# Patient Record
Sex: MALE | Race: BLACK OR AFRICAN AMERICAN | Employment: FULL TIME | URBAN - METROPOLITAN AREA
[De-identification: names, ages, dates, MRNs, and addresses within clinical notes are randomized per-mention and may not be internally consistent; named-entity substitution may affect disease eponyms.]

---

## 2018-07-16 ENCOUNTER — HOSPITAL ENCOUNTER (OUTPATIENT)
Dept: PHYSICAL THERAPY | Age: 53
Discharge: HOME | End: 2018-07-16
Payer: COMMERCIAL

## 2018-07-16 PROCEDURE — 97161 PT EVAL LOW COMPLEX 20 MIN: CPT

## 2018-07-16 PROCEDURE — 97110 THERAPEUTIC EXERCISES: CPT

## 2018-07-16 NOTE — PROGRESS NOTES
PHYSICAL THERAPY - DAILY TREATMENT NOTE    Patient Name: Ceferino Cesar. Date: 2018  : 1965   yes Patient  Verified  Visit #:     Insurance: Payor: Casi Martinez / Plan: [address] / Product Type: [employer] /      In time: 10:00A Out time: 10:45A   Total Treatment Time: 39     Medicare/ BCBS Time Tracking (below)   Total Timed Codes (min):  NA 1:1 Treatment Time:  NA     TREATMENT AREA =  Left foot pain [M79.272]    SUBJECTIVE  Pain Level (on 0 to 10 scale):  0  / 10   Medication Changes/New allergies or changes in medical history, any new surgeries or procedures?    no  If yes, update Summary List   Subjective Functional Status/Changes:  []  No changes reported     See POC          OBJECTIVE  Modalities Rationale:    PD   min [] Estim, type/location:                                      []  att     []  unatt     []  w/US     []  w/ice    []  w/heat    min []  Mechanical Traction: type/lbs                   []  pro   []  sup   []  int   []  cont    []  before manual    []  after manual    min []  Ultrasound, settings/location:      min []  Iontophoresis w/ dexamethasone, location:                                               []  take home patch       []  in clinic    min []  Ice     []  Heat    location/position:     min []  Vasopneumatic Device, press/temp:     min []  Other:    [] Skin assessment post-treatment (if applicable):    []  intact    []  redness- no adverse reaction     []redness  adverse reaction:        10 min Therapeutic Exercise:  [x]  See flow sheet   Rationale:      increase ROM, increase strength, improve coordination, improve balance and increase proprioception to improve the patients ability to perform unlimited ADLs      min Patient Education:  yes  Reviewed HEP   []  Progressed/Changed HEP based on:        Other Objective/Functional Measures:    See POC     Post Treatment Pain Level (on 0 to 10) scale:   0  / 10 ASSESSMENT  Assessment/Changes in Function:     See POC     []  See Progress Note/Recertification   Patient will continue to benefit from skilled PT services to modify and progress therapeutic interventions, address functional mobility deficits, address ROM deficits, address strength deficits, analyze and address soft tissue restrictions, analyze and cue movement patterns, analyze and modify body mechanics/ergonomics, assess and modify postural abnormalities, address imbalance/dizziness and instruct in home and community integration to attain remaining goals.    Progress toward goals / Updated goals:    See POC     PLAN  [x]  Upgrade activities as tolerated yes Continue plan of care   []  Discharge due to :    []  Other:      Therapist: Nellie Ramirez PT, DPT    Date: 7/16/2018 Time: 1:56 PM     Future Appointments  Date Time Provider Valentín Anderson   7/23/2018 3:00 PM Radha [facility]   7/25/2018 11:30 AM [facility]   7/30/2018 9:30 AM [facility]   8/1/2018 2:30 PM Radha [facility]   [facility]2018 [facility]   8/8/2018 2:30 PM Radha [facility]   8/13/2018 2:00 PM Radha [facility]   8/15/2018 1:00 PM [facility]

## 2018-07-16 NOTE — PROGRESS NOTES
Ebony English [address]  [facility] [place] [facility]  [facility] [address], [address] ,[address], [address]  Phone: [phone]  Fax: [phone] / [address] [address] PHYSICAL THERAPY SERVICES  Patient Name: Christopher White. : 1965   Medical   Diagnosis: Left foot pain [M79.672] Treatment Diagnosis: L foot pain   Onset Date: 2-3 mo prior to evaluation     Referral Source: Teresa Teixeira MD [facility]): 2018   Prior Hospitalization: See medical history Provider #: [id number]   Prior Level of Function: Pain free ambulation   Comorbidities: Chronic LBP with L radicular sx   Medications: Verified on Patient Summary List   The Plan of Care and following information is based on the information from the initial evaluation.   ==================================================================================  Assessment / key information:  Pt is a 47 y.o. male who presents to [facility] with Dx of L foot plantar fascitis. Patient reports initial onset of sx approx 2-3 months prior to evaluation with an GIANFRANCO: insidious Patient reports sx are intermittent in nature. Walking first thing in the AM increase sx, stretching and rest decrease symptoms. Average reported pain level at 2-3/10, 5-6/10 at worst & 0/10 at best.      Objective evaluation findings are as follows: 1) TTP along medial calcaneus on the L 2) L ankle strength is grossly:  L posterior tib: 4/5, eversion 4+/5, inversion 4+/5, plantar flexion 4+/5, and dorsiflexion 4+/5. R ankle strength is 5/5 throughout. 3) poor single limb stability noted bilaterally, 15s on firm ground 4) DF AROM is as follows: with knee extended: to neutral bilaterally, with knee bent: R 10 degrees, L 4 degrees. 5) myotomal and dermatomal LE findings are WNL. Patient signs and symptoms are consistent with L plantar fascitis.           Patient can benefit from PT interventions to improve strength, range of motion, balance, proprioception, coordination, decrease pain, to facilitate ADLs & overall functional status.   ==================================================================================  Eval Complexity: History MEDIUM  Complexity : 1-2 comorbidities / personal factors will impact the outcome/ POC ;  Examination  MEDIUM Complexity : 3 Standardized tests and measures addressing body structure, function, activity limitation and / or participation in recreation ; Presentation LOW Complexity : Stable, uncomplicated ;  Decision Making MEDIUM Complexity : FOTO score of 26-74; Overall Complexity LOW   Problem List: pain affecting function, decrease ROM, decrease strength, edema affecting function, impaired gait/ balance, decrease ADL/ functional abilitiies, decrease activity tolerance, decrease flexibility/ joint mobility and decrease transfer abilities   Treatment Plan may include any combination of the following: Therapeutic exercise, Therapeutic activities, Neuromuscular re-education, Physical agent/modality, Gait/balance training, Manual therapy, Aquatic therapy, Patient education, Self Care training, Functional mobility training, Home safety training and Stair training  Patient / Family readiness to learn indicated by: asking questions, trying to perform skills and interest  Persons(s) to be included in education: patient (P)  Barriers to Learning/Limitations: None  Measures taken:    Patient Goal (s): \"\"Relief\"   Patient self reported health status: good  Rehabilitation Potential: good   Short Term Goals: To be accomplished in  2  weeks:  1) Establish HEP to prevent further disability. 2) Patient will report decreased c/o pain to < or = 2/10 to facilitate ADLs with manageable sx in L foot. 3) Patient will maintain SLS on compliant surface with good stability for >/= 20s in order to allow for ease with ambulation without shoes donned.  Long Term Goals:  To be accomplished in  4-6 weeks:  1) Pt to be I and compliant with progressive, high level stretching and flexibility program in order to maintain gains made in physical therapy. 2) Pt will report 75% improvement in overall condition in order to allow for ease with ADLs. 3) Patient will improve DF AROM on the L to 0-5 in order to allow for ease with unlimited ambulation. 4) Patient will increase FOTO score to 76 in order to perform unlimited ADLs. Frequency / Duration:   Patient to be seen  2  times per week for 4-6  weeks:  Patient / Caregiver education and instruction: self care, activity modification and exercises  G-Codes (GP): TJ  Therapist Signature: Arjun Aguilar PT, DPT Date: 8/72/2372   Certification Period: NA Time: 1:35 PM   ===========================================================================================  I certify that the above Physical Therapy Services are being furnished while the patient is under my care. I agree with the treatment plan and certify that this therapy is necessary. Physician Signature:        Date:       Time:     Please sign and return to [facility] [place] or you may fax the signed copy to [phone]. Thank you.

## 2018-07-23 ENCOUNTER — HOSPITAL ENCOUNTER (OUTPATIENT)
Dept: PHYSICAL THERAPY | Age: 53
Discharge: HOME | End: 2018-07-23
Payer: COMMERCIAL

## 2018-07-23 PROCEDURE — 97110 THERAPEUTIC EXERCISES: CPT

## 2018-07-23 PROCEDURE — 97140 MANUAL THERAPY 1/> REGIONS: CPT

## 2018-07-23 NOTE — PROGRESS NOTES
PHYSICAL THERAPY - DAILY TREATMENT NOTE    Patient Name: Arjun Peñaloza. Date: 2018  : 1965   yes Patient  Verified  Visit #:   2   of   12  Insurance: Payor: Del Single / Plan: [place] [employer] P[place] / Product Type: PPO /      In time: 3:00 Out time: 3:52   Total Treatment Time: 52     Medicare/BCBS Time Tracking (below)   Total Timed Codes (min):  NA 1:1 Treatment Time:  NA     TREATMENT AREA =  Left foot pain [M79.672]    SUBJECTIVE  Pain Level (on 0 to 10 scale):  5  / 10   Medication Changes/New allergies or changes in medical history, any new surgeries or procedures?    no  If yes, update Summary List   Subjective Functional Status/Changes:  []  No changes reported     Patient reports always having L heel pain first thing in the morning and increases throughout the day wearing his steel-toe boots. States that he has orthotics in his steel-toe boots, however feels no relief from them. OBJECTIVE  Modalities Rationale:     decrease edema, decrease inflammation and decrease pain to improve patient's ability to return to pain-free walking/standing activities.    min [] Estim, type/location:                                      []  att     []  unatt     []  w/US     []  w/ice    []  w/heat    min []  Mechanical Traction: type/lbs                   []  pro   []  sup   []  int   []  cont    []  before manual    []  after manual    min []  Ultrasound, settings/location:      min []  Iontophoresis w/ dexamethasone, location:                                               []  take home patch       []  in clinic    min []  Ice     []  Heat    location/position:     min []  Vasopneumatic Device, press/temp:     min []  Other:    [] Skin assessment post-treatment (if applicable):    []  intact    []  redness- no adverse reaction     []redness  adverse reaction:        42 min Therapeutic Exercise:  [x]  See flow sheet   Rationale:      increase ROM, increase strength, improve coordination, improve balance and increase proprioception to improve the patients ability to perform pain-free functional ADLs. 10 min Manual Therapy: IASTM to L plantar fascia and calcaneus; STM/DTM to gastroc/soleus, post tib   Rationale:     Increase ROM to improve patient's ability to perform pain-free standing activities     min Therapeutic Activity:    Rationale:       min Neuromuscular Re-ed:    Rationale:         min Gait Training:    Rationale:         min Patient Education:  yes  Reviewed HEP   []  Progressed/Changed HEP based on: Other Objective/Functional Measures:    Presented to PT session with flip flops on. Initiated therapeutic exercises during PT session     Post Treatment Pain Level (on 0 to 10) scale:   4  / 10     ASSESSMENT  Assessment/Changes in Function:     Demonstrated good tolerance with initiation of exercises; denies sharp pain during PT session. Req'd cues to avoid excessive knee rotation during TB exercises. Noted minimal decrease in L heel pain following PT session. Discussed and educated patient on wearing proper shoes to PT and utilizing frozen water bottle at home; patient responded with good understanding. []  See Progress Note/Recertification   Patient will continue to benefit from skilled PT services to modify and progress therapeutic interventions, address functional mobility deficits, address ROM deficits, address strength deficits, analyze and address soft tissue restrictions, analyze and cue movement patterns and instruct in home and community integration to attain remaining goals.    Progress toward goals / Updated goals:    Good progress towards newly established goals     PLAN  []  Upgrade activities as tolerated yes Continue plan of care   []  Discharge due to :    []  Other:      Therapist: RENO Baires    Date: 7/23/2018 Time: 4:12 PM     Future Appointments  Date Time Provider Valentín Anderson   7/23/2018 3:00 PM Catrachito Rahman [facility]   7/25/2018 11:30 AM [facility]   7/30/2018 9:30 AM [facility]   8/1/2018 2:30 PM Mae Sloan [facility]   8/6/2018 2:00 PM Mae Nathalia [facility]   8/8/2018 2:30 PM Mae Sloan [facility]   8/13/2018 2:00 PM Mae Sloan [facility]   8/15/2018 1:00 PM [facility]

## 2018-07-30 ENCOUNTER — HOSPITAL ENCOUNTER (OUTPATIENT)
Dept: PHYSICAL THERAPY | Age: 53
Discharge: HOME | End: 2018-07-30
Payer: COMMERCIAL

## 2018-07-30 PROCEDURE — 97110 THERAPEUTIC EXERCISES: CPT

## 2018-07-30 NOTE — PROGRESS NOTES
PHYSICAL THERAPY - DAILY TREATMENT NOTE Patient Name: Esther Lisa. Date: 2018 : 1965   yes Patient  Verified Visit #:   3   of   12  Insurance: Payor: Heraclio Clark / Plan: [employer] P[employer] / Product Type: PPO / In time: 9:30A Out time: 10:30A Total Treatment Time: 60 Medicare/ [facility] Time Tracking (below) Total Timed Codes (min):  NA 1:1 Treatment Time:  NA  
 
TREATMENT AREA =  Left foot pain [M79.672] SUBJECTIVE Pain Level (on 0 to 10 scale):  4  / 10 Medication Changes/New allergies or changes in medical history, any new surgeries or procedures?    no  If yes, update Summary List  
Subjective Functional Status/Changes:  []  No changes reported \"I can really feel the bump on my heel. \" OBJECTIVE Modalities Rationale:     decrease inflammation and decrease pain to improve patient's ability to perform unlimited ambulation 
 min [] Estim, type/location:    
                                 []  att     []  unatt     []  w/US     []  w/ice    []  w/heat  
 min []  Mechanical Traction: type/lbs   
               []  pro   []  sup   []  int   []  cont    []  before manual    []  after manual  
 min []  Ultrasound, settings/location:    
 min []  Iontophoresis w/ dexamethasone, location:   
                                           []  take home patch       []  in clinic  
10 min [x]  Ice     []  Heat    location/position: Supine with wedge to L ankle and foot. min []  Vasopneumatic Device, press/temp:   
 min []  Other:   
[] Skin assessment post-treatment (if applicable):   
[]  intact    []  redness- no adverse reaction    
[]redness  adverse reaction:     
 
45 min Therapeutic Exercise:  [x]  See flow sheet Rationale:      increase ROM, increase strength, improve coordination, improve balance and increase proprioception to improve the patients ability to perform unlimited ADLs 5NB min Manual Therapy: STM/ DTM to L plantar fascia, manual grastroc stretch Rationale:      decrease pain, increase ROM and increase tissue extensibility to improve patient's ability to perform unlimited ADLs 
 
 min Patient Education:  yes  Reviewed HEP []  Progressed/Changed HEP based on: Other Objective/Functional Measures: 
 
Resumed standing gastroc/soleus stretch on ground. Post Treatment Pain Level (on 0 to 10) scale:   3  / 10 ASSESSMENT Assessment/Changes in Function:  
 
Patient continues to have palpable hard lump on on calcaneus that is tender to touch. Plan to continue with flexibility of gastroc/ soleus. []  See Progress Note/Recertification Patient will continue to benefit from skilled PT services to modify and progress therapeutic interventions, address functional mobility deficits, address ROM deficits, address strength deficits, analyze and address soft tissue restrictions, analyze and cue movement patterns, analyze and modify body mechanics/ergonomics, assess and modify postural abnormalities, address imbalance/dizziness and instruct in home and community integration to attain remaining goals. Progress toward goals / Updated goals: 
 
Progressing towards STG 2. PLAN [x]  Upgrade activities as tolerated yes Continue plan of care  
[]  Discharge due to :   
[]  Other:   
 
Therapist: Izabel Butler PT, DPT Date: 7/30/2018 Time: 1:15 PM  
 
Future Appointments Date Time Provider Valentín Anderson 8/1/2018 2:30 PM Frankie Garrido [facility]  
8/6/2018 2:00 PM Frankie Garrido [facility]  
8/8/2018 2:30 PM Frankie Garrido [facility]  
8/13/2018 2:00 PM Frankie Hardymelissa [facility]  
8/15/2018 1:00 PM [facility]

## 2018-08-01 ENCOUNTER — HOSPITAL ENCOUNTER (OUTPATIENT)
Dept: PHYSICAL THERAPY | Age: 53
End: 2018-08-01
Payer: COMMERCIAL

## 2018-08-03 ENCOUNTER — HOSPITAL ENCOUNTER (OUTPATIENT)
Dept: PHYSICAL THERAPY | Age: 53
Discharge: HOME | End: 2018-08-03
Payer: COMMERCIAL

## 2018-08-03 PROCEDURE — 97110 THERAPEUTIC EXERCISES: CPT

## 2018-08-03 NOTE — PROGRESS NOTES
PHYSICAL THERAPY - DAILY TREATMENT NOTE Patient Name: Krysta Rai. Date: 8/3/2018 : 1965   yes Patient  Verified Visit #:     Insurance: Payor: Kadeem Cisneros / [place]: [place] [employer] P[place] / Product Type: PPO / In time: 10:04 AM Out time: 11:00 AM  
Total Treatment Time: 64 Medicare/[facility] Time Tracking (below) Total Timed Codes (min):  NA 1:1 Treatment Time:  NA  
 
TREATMENT AREA =  Left foot pain [M79.672] SUBJECTIVE Pain Level (on 0 to 10 scale):  4  / 10 Medication Changes/New allergies or changes in medical history, any new surgeries or procedures?    no  If yes, update Summary List  
Subjective Functional Status/Changes:  []  No changes reported Patient continues to report more noticeable L heel pain in the morning and after work. SEE PN  
 
OBJECTIVE Modalities Rationale:     decrease edema, decrease inflammation and decrease pain to improve patient's ability to return to pain-free walking/standing activities. min [] Estim, type/location:    
                                 []  att     []  unatt     []  w/US     []  w/ice    []  w/heat  
 min []  Mechanical Traction: type/lbs   
               []  pro   []  sup   []  int   []  cont    []  before manual    []  after manual  
 min []  Ultrasound, settings/location:    
 min []  Iontophoresis w/ dexamethasone, location:   
                                           []  take home patch       []  in clinic  
10 min [x]  Ice     []  Heat    location/position: Longsit to L ankle/heel post-session  
 min []  Vasopneumatic Device, press/temp:   
 min []  Other:   
[] Skin assessment post-treatment (if applicable):   
[]  intact    []  redness- no adverse reaction    
[]redness  adverse reaction:     
 
46 min Therapeutic Exercise:  [x]  See flow sheet Rationale:      increase ROM, increase strength, improve coordination, improve balance and increase proprioception to improve the patients ability to perform pain-free functional ADLs. held min Manual Therapy: IASTM to L plantar fascia and calcaneus; STM/DTM to gastroc/soleus, post tib Rationale:     Increase ROM to improve patient's ability to perform pain-free standing activities 
 
 min Therapeutic Activity:   
Rationale:   
 
 min Neuromuscular Re-ed:   
Rationale:     
 
 min Gait Training:   
Rationale:     
 
 min Patient Education:  yes  Reviewed HEP []  Progressed/Changed HEP based on: Other Objective/Functional Measures: FOTO: 68/100 (no change) SEE PN Post Treatment Pain Level (on 0 to 10) scale:   2  / 10 ASSESSMENT Assessment/Changes in Function:  
 
Patient demonstrated good L heel pain relief following ice; noted decrease initial subjective pain. Discussed utilizing frozen water 2x a day to continue to alleviate L heel pain; patient responded with good understanding SEE PN 
  
[]  See Progress Note/Recertification Patient will continue to benefit from skilled PT services to modify and progress therapeutic interventions, address functional mobility deficits, address ROM deficits, address strength deficits, analyze and address soft tissue restrictions, analyze and cue movement patterns and instruct in home and community integration to attain remaining goals. Progress toward goals / Updated goals: SEE PN 
  
 
PLAN 
[]  Upgrade activities as tolerated yes Continue plan of care  
[]  Discharge due to :   
[]  Other:   
 
Therapist: RENO Mariee Date: 8/3/2018 Time: 11:50 AM  
 
Future Appointments Date Time Provider Valentín Anderson 8/3/2018 10:00 AM Alysa Sanchez [facility]  
8/6/2018 2:00 PM Alysa Sanchez [facility]  
8/8/2018 2:30 PM Alysa Sanchez [facility]  
8/13/2018 2:00 PM Alysa Sanchez [facility]  
8/15/2018 1:00 PM [facility]

## 2018-08-03 NOTE — PROGRESS NOTES
Ebony Lawsonkitamara English [address]  [facility] [address] [place] [address] [facility], [address] [place] [address], [address]  Phone: [phone]  Fax: [phone] PROGRESS NOTE Patient Name: Shakira Salvador. : 1965 Treatment/Medical Diagnosis: Left foot pain [M79.672] Referral Source: Wesley Aceves MD    
Date of Initial Visit: 18 Attended Visits: 4 Missed Visits: 1 SUMMARY OF TREATMENT Therapeutic exercises, manual therapy, patient education, and home exercise program involving improving L foot flexibility, strength, and endurance; utilization of ice to reduce c/o L foot pain. CURRENT STATUS Mr. Rc Voss has made minimal progress towards his PT goals his L foot pain. Patient reports 20% overall improvement since beginning physical therapy; states min pain 3/10, avg pain 4/10, and max pain 5/10 with prolonged standing and walking. Patient notes most of the pain in L calcaneus, presenting with a painful, palpable, and moveable mass. Patient has had temporary pain relief following ice; educated patient to continue icing L heel at home to reduce c/o pain and manage symptoms. Goal/Measure of Progress Goal Met? 1.  Pt to be I and compliant with progressive, high level stretching and flexibility program in order to maintain gains made in physical therapy. Status at last Eval: - Current Status: Semi-compliant progressing 2. Pt will report 75% improvement in overall condition in order to allow for ease with ADLs. Status at last Eval: - Current Status: 20%  progressing 3. Patient will improve DF AROM on the L to 0-5 in order to allow for ease with unlimited ambulation. Status at last Eval: 4 deg Current Status: 4 deg No change 4. Patient will increase FOTO score to 76 in order to perform unlimited ADLs. Status at last Eval: 68 Current Status: 68 No change New Goals to be achieved in __2-3__  weeks: 
Continue with  goals listed above.  
 
G-Codes (GP): NA RECOMMENDATIONS Patient to continue 2x/wk for 2-3 weeks to improve L foot flexibility, strength, and endurance to manage symptoms during standing/walking activities at home and work. If you have any questions/comments please contact us directly at [phone]. Thank you for allowing us to assist in the care of your patient. Therapist Signature: RENO López Date: 8/3/2018 Time: 11:52 AM  
NOTE TO PHYSICIAN:  PLEASE COMPLETE THE ORDERS BELOW AND FAX TO [facility]: [phone]. If you are unable to process this request in 24 hours please contact our office: [phone]. 
 
___ I have read the above report and request that my patient continue as recommended.  
___ I have read the above report and request that my patient continue therapy with the following changes/special instructions:_________________________________________________________  
___ I have read the above report and request that my patient be discharged from therapy.   
 
Physician Signature:        Date:       Time:

## 2018-08-06 ENCOUNTER — APPOINTMENT (OUTPATIENT)
Dept: PHYSICAL THERAPY | Age: 53
End: 2018-08-06
Payer: COMMERCIAL

## 2018-08-06 ENCOUNTER — HOSPITAL ENCOUNTER (OUTPATIENT)
Dept: PHYSICAL THERAPY | Age: 53
Discharge: HOME | End: 2018-08-06
Payer: COMMERCIAL

## 2018-08-06 PROCEDURE — 97110 THERAPEUTIC EXERCISES: CPT

## 2018-08-06 NOTE — PROGRESS NOTES
PHYSICAL THERAPY - DAILY TREATMENT NOTE    Patient Name: Marci Dominguez. Date: 2018  : 1965   yes Patient  Verified  Visit #:     Insurance: Payor: Evelyn Fisher / [place] / Product Type: PPO /      In time: 3:34 Out time: 4:13   Total Treatment Time: 39     Medicare/[facility] Time Tracking (below)   Total Timed Codes (min):  NA 1:1 Treatment Time:  NA     TREATMENT AREA =  Left foot pain [M79.672]    SUBJECTIVE  Pain Level (on 0 to 10 scale):  5  / 10   Medication Changes/New allergies or changes in medical history, any new surgeries or procedures?    no  If yes, update Summary List   Subjective Functional Status/Changes:  []  No changes reported     Patient reports receiving cortisone injection prior to PT session. Per patient, MD stated to continue with PT session today and the \"bump was just swelling\". Patient was given no restrictions for work duties. OBJECTIVE  Modalities Rationale:     decrease edema, decrease inflammation and decrease pain to improve patient's ability to return to pain-free walking/standing activities.    min [] Estim, type/location:                                      []  att     []  unatt     []  w/US     []  w/ice    []  w/heat    min []  Mechanical Traction: type/lbs                   []  pro   []  sup   []  int   []  cont    []  before manual    []  after manual    min []  Ultrasound, settings/location:      min []  Iontophoresis w/ dexamethasone, location:                                               []  take home patch       []  in clinic   10 min [x]  Ice     []  Heat    location/position: Longsit to L ankle/heel post-session    min []  Vasopneumatic Device, press/temp:     min []  Other:    [] Skin assessment post-treatment (if applicable):    []  intact    []  redness- no adverse reaction     []redness  adverse reaction:        29 min Therapeutic Exercise:  [x]  See flow sheet   Rationale:      increase ROM, increase strength, improve coordination, improve balance and increase proprioception to improve the patients ability to perform pain-free functional ADLs. held min Manual Therapy: IASTM to L plantar fascia and calcaneus; STM/DTM to gastroc/soleus, post tib   Rationale:     Increase ROM to improve patient's ability to perform pain-free standing activities     min Therapeutic Activity:    Rationale:       min Neuromuscular Re-ed:    Rationale:         min Gait Training:    Rationale:         min Patient Education:  yes  Reviewed HEP   []  Progressed/Changed HEP based on: Other Objective/Functional Measures:    Modified today's PT session due to patient's initial subjective statement   Post Treatment Pain Level (on 0 to 10) scale:   5  / 10     ASSESSMENT  Assessment/Changes in Function:     Able to perform modified exercises with no increase L heel pain, however patient noted no pain relief following ice. Discussed icing again tonight and monitoring symptoms at work due to cortisone injection; patient responded with good understanding. []  See Progress Note/Recertification   Patient will continue to benefit from skilled PT services to modify and progress therapeutic interventions, address functional mobility deficits, address ROM deficits, address strength deficits, analyze and address soft tissue restrictions, analyze and cue movement patterns and instruct in home and community integration to attain remaining goals.    Progress toward goals / Updated goals:    No significant progress towards PT goals       PLAN  []  Upgrade activities as tolerated yes Continue plan of care   []  Discharge due to :    []  Other:      Therapist: [address]    Date: 8/6/2018 Time: 4:34 PM     Future Appointments  Date Time Provider Valentín Anderson   8/6/2018 3:30  [facility]   8/8/2018 2:30  [facility]   8/13/2018 2:00  [facility]   8/15/2018 5:30  [facility]

## 2018-08-08 ENCOUNTER — HOSPITAL ENCOUNTER (OUTPATIENT)
Dept: PHYSICAL THERAPY | Age: 53
Discharge: HOME | End: 2018-08-08
Payer: COMMERCIAL

## 2018-08-08 PROCEDURE — 97110 THERAPEUTIC EXERCISES: CPT

## 2018-08-08 NOTE — PROGRESS NOTES
PHYSICAL THERAPY - DAILY TREATMENT NOTE    Patient Name: Jimmy Mckee. Date: 2018  : 1965   yes Patient  Verified  Visit #:     Insurance: Payor: Sherlyn Alexis / [place]: [place] [employer] / Product Type: PPO /      In time: 2:32 Out time: 3:30   Total Treatment Time: 58     Medicare/[facility] Time Tracking (below)   Total Timed Codes (min):  NA 1:1 Treatment Time:  NA     TREATMENT AREA =  Left foot pain [M79.672]    SUBJECTIVE  Pain Level (on 0 to 10 scale): 2  / 10   Medication Changes/New allergies or changes in medical history, any new surgeries or procedures?    no  If yes, update Summary List   Subjective Functional Status/Changes:  []  No changes reported     \"I woke up the next day after the cortisone injection and I had no pain in the morning. That's been my biggest issue, waking up with pain in the morning. I've been walking around at work and I feel it, but it's not too bad. \"  Patient also reports he has been compliant with utilization of ice in the morning and evening. OBJECTIVE  Modalities Rationale:     decrease edema, decrease inflammation and decrease pain to improve patient's ability to return to pain-free walking/standing activities.    min [] Estim, type/location:                                      []  att     []  unatt     []  w/US     []  w/ice    []  w/heat    min []  Mechanical Traction: type/lbs                   []  pro   []  sup   []  int   []  cont    []  before manual    []  after manual    min []  Ultrasound, settings/location:      min []  Iontophoresis w/ dexamethasone, location:                                               []  take home patch       []  in clinic   10 min [x]  Ice     []  Heat    location/position: Longsit to L ankle/heel post-session    min []  Vasopneumatic Device, press/temp:     min []  Other:    [] Skin assessment post-treatment (if applicable):    []  intact    []  redness- no adverse reaction     []redness  adverse reaction: 48 min Therapeutic Exercise:  [x]  See flow sheet   Rationale:      increase ROM, increase strength, improve coordination, improve balance and increase proprioception to improve the patients ability to perform pain-free functional ADLs. held min Manual Therapy: IASTM to L plantar fascia and calcaneus; STM/DTM to gastroc/soleus, post tib   Rationale:     Increase ROM to improve patient's ability to perform pain-free standing activities     min Therapeutic Activity:    Rationale:       min Neuromuscular Re-ed:    Rationale:         min Gait Training:    Rationale:         min Patient Education:  yes  Reviewed HEP   []  Progressed/Changed HEP based on: Other Objective/Functional Measures:    Resumed standing exercises during PT session; progressed multiple ROM exercises (see flowsheet)      Post Treatment Pain Level (on 0 to 10) scale:   2  / 10     ASSESSMENT  Assessment/Changes in Function:     Noted minimal L heel discomfort during SL rockerboard PF/DF when increasing weight bearing through heel; discomfort decreased with utilization of ice   Able to perform remaining exercises without increase pain or discomfort     Discussed and educated patient on performing stretches during the work day when L heel pain becomes uncomfortable; patient responded with good understanding     []  See Progress Note/Recertification   Patient will continue to benefit from skilled PT services to modify and progress therapeutic interventions, address functional mobility deficits, address ROM deficits, address strength deficits, analyze and address soft tissue restrictions, analyze and cue movement patterns and instruct in home and community integration to attain remaining goals.    Progress toward goals / Updated goals:    Slow, gradual progress towards LTGs  Will continue to progress therex per patient's tolerance       PLAN  []  Upgrade activities as tolerated yes Continue plan of care   []  Discharge due to :    []  Other: Therapist: RENO Beth    Date: 8/8/2018 Time: 3:54 PM     Future Appointments  Date Time Provider Valentín Anderson   8/8/2018 2:30 PM Ulanda Bamberger [facility]   8/13/2018 2:00 PM Sarai Gonzalez [facility]   8/15/2018 5:30 PM Ulanda Bamberger [facility]

## 2018-08-13 ENCOUNTER — HOSPITAL ENCOUNTER (OUTPATIENT)
Dept: PHYSICAL THERAPY | Age: 53
Discharge: HOME | End: 2018-08-13
Payer: COMMERCIAL

## 2018-08-13 PROCEDURE — 97110 THERAPEUTIC EXERCISES: CPT

## 2018-08-13 NOTE — PROGRESS NOTES
PHYSICAL THERAPY - DAILY TREATMENT NOTE    Patient Name: Nancy Voss. Date: 2018  : 1965   yes Patient  Verified  Visit #:     Insurance: Payor: Daniele Hogan / Plan: [place] / Product Type: PPO /      In time: 2:04 Out time: 3:00   Total Treatment Time: 56     Medicare/[facility] Time Tracking (below)   Total Timed Codes (min):  NA 1:1 Treatment Time:  NA     TREATMENT AREA =  Left foot pain [M79.672]    SUBJECTIVE  Pain Level (on 0 to 10 scale): 3  / 10   Medication Changes/New allergies or changes in medical history, any new surgeries or procedures?    no  If yes, update Summary List   Subjective Functional Status/Changes:  []  No changes reported     Patient reports noticing more L heel pain yesterday; states that he had been on his feet a lot more over the weekend. Also reports that he feels like coritsone injection is wearing off. States that he noticed the pain this morning, however it was not as severe as it has been. OBJECTIVE  Modalities Rationale:     decrease edema, decrease inflammation and decrease pain to improve patient's ability to return to pain-free walking/standing activities.    min [] Estim, type/location:                                      []  att     []  unatt     []  w/US     []  w/ice    []  w/heat    min []  Mechanical Traction: type/lbs                   []  pro   []  sup   []  int   []  cont    []  before manual    []  after manual    min []  Ultrasound, settings/location:      min []  Iontophoresis w/ dexamethasone, location:                                               []  take home patch       []  in clinic   10 min [x]  Ice     []  Heat    location/position: Longsit to L ankle/heel post-session    min []  Vasopneumatic Device, press/temp:     min []  Other:    [] Skin assessment post-treatment (if applicable):    []  intact    []  redness- no adverse reaction     []redness  adverse reaction:        46 min Therapeutic Exercise:  [x]  See flow sheet   Rationale:      increase ROM, increase strength, improve coordination, improve balance and increase proprioception to improve the patients ability to perform pain-free functional ADLs. held min Manual Therapy: IASTM to L plantar fascia and calcaneus; STM/DTM to gastroc/soleus, post tib   Rationale:     Increase ROM to improve patient's ability to perform pain-free standing activities     min Therapeutic Activity:    Rationale:       min Neuromuscular Re-ed:    Rationale:         min Gait Training:    Rationale:         min Patient Education:  yes  Reviewed HEP   []  Progressed/Changed HEP based on: Other Objective/Functional Measures: Added eccentric HR off step (up 2, down 1) and TB DF     Post Treatment Pain Level (on 0 to 10) scale:   3  / 10     ASSESSMENT  Assessment/Changes in Function:     Demonstrates improved tolerance with standing exercises when performing in shoes. Educated patient to stretch between standing exercises/home and work activities to reduce c/o L heel pain; patient responded with good understanding. Denies L heel pain when performing progression of exercises. []  See Progress Note/Recertification   Patient will continue to benefit from skilled PT services to modify and progress therapeutic interventions, address functional mobility deficits, address ROM deficits, address strength deficits, analyze and address soft tissue restrictions, analyze and cue movement patterns and instruct in home and community integration to attain remaining goals.    Progress toward goals / Updated goals:    Slow progress towards ROM and improvement goals   Will continue to progress therex per patient's tolerance       PLAN  []  Upgrade activities as tolerated yes Continue plan of care   []  Discharge due to :    []  Other:      Therapist: RENO Marcelo    Date: 8/13/2018 Time: 3:16 PM     Future Appointments  Date Time Provider Valentín Anderson   8/15/2018 5:30 PM Genevieve Osullivan [facility]

## 2018-08-15 ENCOUNTER — HOSPITAL ENCOUNTER (OUTPATIENT)
Dept: PHYSICAL THERAPY | Age: 53
End: 2018-08-15
Payer: COMMERCIAL

## 2018-08-17 ENCOUNTER — HOSPITAL ENCOUNTER (OUTPATIENT)
Dept: PHYSICAL THERAPY | Age: 53
Discharge: HOME | End: 2018-08-17
Payer: COMMERCIAL

## 2018-08-17 PROCEDURE — 97110 THERAPEUTIC EXERCISES: CPT

## 2018-08-17 NOTE — PROGRESS NOTES
PHYSICAL THERAPY - DAILY TREATMENT NOTE    Patient Name: Raymond Alvarenga. Date: 2018  : 1965   yes Patient  Verified  Visit #:     Insurance: Payor: Sindhu Neal / Plan: [place] / Product Type: PPO /      In time: 12:35 Out time: 1:32   Total Treatment Time: 57     Medicare/[facility] Time Tracking (below)   Total Timed Codes (min):  NA 1:1 Treatment Time:  NA     TREATMENT AREA =  Left foot pain [M79.672]    SUBJECTIVE  Pain Level (on 0 to 10 scale): 2  / 10   Medication Changes/New allergies or changes in medical history, any new surgeries or procedures?    no  If yes, update Summary List   Subjective Functional Status/Changes:  []  No changes reported     Patient reports 50% overall improvement since beginning physical therapy; since cortisone injection patient has consistently had a 2/10 pain in L heel. Continues to report most pain is first thing when he wakes up. SEE D/C       OBJECTIVE  Modalities Rationale:     decrease edema, decrease inflammation and decrease pain to improve patient's ability to return to pain-free walking/standing activities.    min [] Estim, type/location:                                      []  att     []  unatt     []  w/US     []  w/ice    []  w/heat    min []  Mechanical Traction: type/lbs                   []  pro   []  sup   []  int   []  cont    []  before manual    []  after manual    min []  Ultrasound, settings/location:      min []  Iontophoresis w/ dexamethasone, location:                                               []  take home patch       []  in clinic   10 min [x]  Ice     []  Heat    location/position: Longsit to L ankle/heel post-session    min []  Vasopneumatic Device, press/temp:     min []  Other:    [] Skin assessment post-treatment (if applicable):    []  intact    []  redness- no adverse reaction     []redness  adverse reaction:        47 min Therapeutic Exercise:  [x]  See flow sheet   Rationale:      increase ROM, increase strength, improve coordination, improve balance and increase proprioception to improve the patients ability to perform pain-free functional ADLs. held min Manual Therapy: IASTM to L plantar fascia and calcaneus; STM/DTM to gastroc/soleus, post tib   Rationale:     Increase ROM to improve patient's ability to perform pain-free standing activities     min Therapeutic Activity:    Rationale:       min Neuromuscular Re-ed:    Rationale:         min Gait Training:    Rationale:         min Patient Education:  yes  Reviewed HEP   []  Progressed/Changed HEP based on: Other Objective/Functional Measures: FOTO: 73 (5 point increase)  L ankle DF AROM: 6 deg    SEE D/C     Post Treatment Pain Level (on 0 to 10) scale:   2 / 10     ASSESSMENT  Assessment/Changes in Function:     SEE D/C     []  See Progress Note/Recertification   Patient will continue to benefit from skilled PT services to modify and progress therapeutic interventions, address functional mobility deficits, address ROM deficits, address strength deficits, analyze and address soft tissue restrictions, analyze and cue movement patterns and instruct in home and community integration to attain remaining goals. Progress toward goals / Updated goals:    SEE D/C       PLAN  []  Upgrade activities as tolerated no Continue plan of care   [x]  Discharge due to : Progressing towards PT goals   []  Other:      Therapist: RENO Hill    Date: 8/17/2018 Time: 2:25 PM     No future appointments.

## 2018-08-17 NOTE — PROGRESS NOTES
Ebony English [address]  [facility] PHYSICAL THERAPY AT [address] [place], [place] [address], [address] [place] [phone] [facility]  Phone: [phone]  Fax: [phone] SUMMARY  Patient Name: Alejandro Umana. : 1965   Treatment/Medical Diagnosis: Left foot pain [M79.672]   Referral Source: Gonzalez Leon MD     Date of Initial Visit: 18 Attended Visits: 8 Missed Visits: 2     SUMMARY OF TREATMENT  Therapeutic exercises, manual therapy, patient education, and home exercise program involving improving L foot flexibility, strength, and endurance; utilization of ice to reduce c/o L foot pain. CURRENT STATUS  Mr. Bryant Fajardo has made minimal progress towards his goals for L foot pain. Reports 50% overall improvement since beginning physical therapy and states since he received cortisone injection (18) and pain has decreased to an average pain of 2/10. Continues to report most pain in the morning, however patient is able to manage his symptoms with rest and ice. Discussed and educated patient on importance of compliance with HEP to maintain gains made from physical therapy; patient responded with good understanding    Goal/Measure of Progress Goal Met? 1.  Pt to be I and compliant with progressive, high level stretching and flexibility program in order to maintain gains made in physical therapy. Status at last Eval: Semi-compliant with basic HEP Current Status: Semi-compliant progressing   2. Pt will report 75% improvement in overall condition in order to allow for ease with ADLs. Status at last Eval: 20% Current Status: 50% progressing   3. Patient will improve DF AROM on the L to 0-5 in order to allow for ease with unlimited ambulation. Status at last Eval: 4 deg Current Status: 6 deg yes   4. Patient will increase FOTO score to 76 in order to perform unlimited ADLs. Status at last Eval: 68 Current Status: 73 progressing     RECOMMENDATIONS  Discontinue therapy.  Progressing towards or have reached established goals. If you have any questions/comments please contact us directly at [phone]. Thank you for allowing us to assist in the care of your patient.     Therapist Signature: RENO Murphy Date: 10/5/18     Time: 4:34 PM

## 2025-03-24 ENCOUNTER — HOSPITAL ENCOUNTER (OUTPATIENT)
Facility: HOSPITAL | Age: 60
Setting detail: RECURRING SERIES
Discharge: HOME | End: 2025-03-27
Payer: COMMERCIAL

## 2025-03-24 PROCEDURE — 97162 PT EVAL MOD COMPLEX 30 MIN: CPT

## 2025-03-24 NOTE — PROGRESS NOTES
PHYSICAL / OCCUPATIONAL THERAPY - DAILY TREATMENT NOTE    Patient Name: Papa Zayas Jr.    Date: 3/24/2025    : 1965  Insurance: Payor: MIKEY / Plan: SWAP[employer]ANSIS[employer] POS / Product Type: *No Product type* /      Patient  verified Yes     Visit #   Current / Total 1 8-12   Time   In / Out 12:58 1:43   Pain   In / Out 0/10 0/10   Subjective Functional Status/Changes: See Eval/POC.     TREATMENT AREA =  Spinal stenosis, lumbar region with neurogenic claudication    OBJECTIVE    45 min [x]Eval - untimed                      Therapeutic Procedures:    Tx Min Billable or 1:1 Min (if diff from Tx Min) Procedure, Rationale, Specifics   0  [id number] Therapeutic Exercise (timed):  increase ROM, strength, coordination, balance, and proprioception to improve patient's ability to progress to PLOF and address remaining functional goals. (see flow sheet as applicable)     Details if applicable:            Details if applicable:            Details if applicable:            Details if applicable:            Details if applicable:     0  MC BC Totals Reminder: bill using total billable min of TIMED therapeutic procedures (example: do not include dry needle or estim unattended, both untimed codes, in totals to left)  8-22 min = 1 unit; 23-37 min = 2 units; 38-52 min = 3 units; 53-67 min = 4 units; 68-82 min = 5 units   Total Total     Charge Capture    [x]  Patient Education billed concurrently with other procedures   [x] Review HEP    [] Progressed/Changed HEP, detail:    [] Other detail:       Objective Information/Functional Measures/Assessment    See Eval/POC.    Patient will continue to benefit from skilled PT / OT services to modify and progress therapeutic interventions, analyze and address functional mobility deficits, analyze and address ROM deficits, analyze and address strength deficits, analyze and address soft tissue restrictions, analyze and cue for proper movement patterns, analyze and modify for postural

## 2025-03-24 NOTE — THERAPY EVALUATION
RAY AGUILAR [facility]  [address], [address], [place] [address] Ph: [phone] Fx: [phone]  Plan of Care / Statement of Necessity for Physical Therapy Services     Patient Name: Papa Zayas Jr. : 1965   Medical   Diagnosis: Spinal stenosis, lumbar region with neurogenic claudication Treatment Diagnosis:  M54.59  OTHER LOWER BACK PAIN and M54.42  LUMBAGO WITH SCIATICA, LEFT SIDE    Onset Date:        Referral Source: Sirisha Calixto PA Start of Care (SOC): 3/24/2025   Prior Hospitalization: See medical history Provider #: [id number]   Prior Level of Function:   Independent work, ADLs, community mobility, home chores, pet care, sleep, and recreation activities.   Comorbidities:  Musculoskeletal disorders--Hx/of right knee surgery age 15 and more recent possible left knee meniscus dysfunction; history of past PT here for left plantar fasciitis     Assessment / key information:  Patient is a 59 year old right handed male referred to PT by his PA-C/doctor (2025) for \"Lumbar spinal stenosis with neurogenic claudication\".  Today in PT, patient reports first episode of acute LBP ~ with heavy lifting incident; \"could barely bend over; OK a few weeks later\".  He also reports that when he got to be in his 40's he would \"feel it now and then\" with sleeping.  Pt also relates beginning to experience numbness left anterior leg area may years ago/in his 40's and ongoing since then.  Pt reports that about 3 months ago he was painting his home and began to have left first toe pain and that at that at the end of the day it was \"purple\", but no known trauma; coloration returned to normal in a few days, but since then numbness left first toe and also 2nd/3rd toes.  Pt reports recently developing some numbness right first and 2nd toes.  Pt c/o pain left posteolateral low back/waist area and comes and goes into left lateral hip and/or anterior thigh areas.

## 2025-03-31 ENCOUNTER — HOSPITAL ENCOUNTER (OUTPATIENT)
Facility: HOSPITAL | Age: 60
Setting detail: RECURRING SERIES
Discharge: HOME | End: 2025-04-03
Payer: COMMERCIAL

## 2025-03-31 PROCEDURE — 97530 THERAPEUTIC ACTIVITIES: CPT

## 2025-03-31 PROCEDURE — 97110 THERAPEUTIC EXERCISES: CPT

## 2025-03-31 NOTE — PROGRESS NOTES
PHYSICAL / OCCUPATIONAL THERAPY - DAILY TREATMENT NOTE    Patient Name: Papa Zayas Jr.    Date: 3/31/2025    : 1965  Insurance: Payor: MIKEY / Plan: MIKEY POS / Product Type: *No Product type* /      Patient  verified Yes     Visit #   Current / Total 2 8-12   Time   In / Out 11:05 11:48   Pain   In / Out 6/10 6/10   Subjective Functional Status/Changes: Pt reports continued left lumbar pain and left anterior leg and toe numbness.     TREATMENT AREA =  Spinal stenosis, lumbar region with neurogenic claudication  Lumbago with sciatica, left side  Other low back pain    OBJECTIVE    Modalities Rationale:     decrease edema, decrease inflammation, decrease pain, and increase tissue extensibility to improve patient's ability to progress to PLOF and address remaining functional goals.     min [] Estim Unattended, type/location:                                      []  w/ice    []  w/heat    min [] Estim Attended, type/location:                                     []  w/US     []  w/ice    []  w/heat    []  TENS insruct      min []  Mechanical Traction: type/lbs                   []  pro   []  sup   []  int   []  cont    []  before manual    []  after manual    min []  Ultrasound, settings/location:      min  unbill []  Ice     []  Heat    location/position:     min []  Paraffin,  details:     min []  Vasopneumatic Device, press/temp:     min []  Whirlpool / Fluido:    If using vaso (only need to measure limb vaso being performed on)      pre-treatment girth :       post-treatment girth :       measured at (landmark location) :      min []  Other:    Skin assessment post-treatment:   Intact      Therapeutic Procedures:    Tx Min Billable or 1:1 Min (if diff from Tx Min) Procedure, Rationale, Specifics   35  [id number] Therapeutic Exercise (timed):  increase ROM, strength, coordination, balance, and proprioception to improve patient's ability to progress to PLOF and address remaining functional goals. (see

## 2025-04-03 ENCOUNTER — HOSPITAL ENCOUNTER (OUTPATIENT)
Facility: HOSPITAL | Age: 60
Setting detail: RECURRING SERIES
Discharge: HOME | End: 2025-04-06
Payer: COMMERCIAL

## 2025-04-03 PROCEDURE — 97110 THERAPEUTIC EXERCISES: CPT

## 2025-04-03 PROCEDURE — 97530 THERAPEUTIC ACTIVITIES: CPT

## 2025-04-03 NOTE — PROGRESS NOTES
PHYSICAL / OCCUPATIONAL THERAPY - DAILY TREATMENT NOTE    Patient Name: Papa Zayas Jr.    Date: 4/3/2025    : 1965  Insurance: Payor: [employer] / Plan: MI[employer] / Product Type: *No Product type* /      Patient  verified Yes     Visit #   Current / Total 3 8-12   Time   In / Out 11:51 am 12:34 pm   Pain   In / Out 4 3   Subjective Functional Status/Changes: Pt reports  left low and intermittent left hip . Constant numbness left LE to shin and foot     TREATMENT AREA =  Spinal stenosis, lumbar region with neurogenic claudication  Lumbago with sciatica, left side  Other low back pain    OBJECTIVE    Modalities Rationale:     decrease edema, decrease inflammation, decrease pain, and increase tissue extensibility to improve patient's ability to progress to PLOF and address remaining functional goals.     min [] Estim Unattended, type/location:                                      []  w/ice    []  w/heat    min [] Estim Attended, type/location:                                     []  w/US     []  w/ice    []  w/heat    []  TENS insruct      min []  Mechanical Traction: type/lbs                   []  pro   []  sup   []  int   []  cont    []  before manual    []  after manual    min []  Ultrasound, settings/location:     10 min  unbill []  Ice     [x]  Heat    location/position: Supine low back    min []  Paraffin,  details:     min []  Vasopneumatic Device, press/temp:     min []  Whirlpool / Fluido:    If using vaso (only need to measure limb vaso being performed on)      pre-treatment girth :       post-treatment girth :       measured at (landmark location) :      min []  Other:    Skin assessment post-treatment:   Intact      Therapeutic Procedures:    Tx Min Billable or 1:1 Min (if diff from Tx Min) Procedure, Rationale, Specifics   20  [id number] Therapeutic Exercise (timed):  increase ROM, strength, coordination, balance, and proprioception to improve patient's ability to progress to PLOF and address

## 2025-04-07 ENCOUNTER — HOSPITAL ENCOUNTER (OUTPATIENT)
Facility: HOSPITAL | Age: 60
Setting detail: RECURRING SERIES
Discharge: HOME | End: 2025-04-10
Payer: COMMERCIAL

## 2025-04-07 PROCEDURE — 97112 NEUROMUSCULAR REEDUCATION: CPT

## 2025-04-07 PROCEDURE — 97110 THERAPEUTIC EXERCISES: CPT

## 2025-04-07 NOTE — PROGRESS NOTES
Not addressed this visit.  5.  No/minimal tenderness and/or muscle hypertonicity to palpation.               Status at IE:  tenderness with increased muscle tension left L-S paraspinals and quadratus and superior posterior gluteal area; tension right L-S paraspinals and quadratus.              Not addressed/reassessed this visit.  6.  No/negligible toe numbness bilat.               Status at IE:  numbness left first toe and also 2nd/3rd toes; some numbness right first and 2nd toes.              Numbness symptoms same as at IE and no changes during or after Tx today.     Next PN/ RC due 04/22/204.  Auth due (visit number/ date) 30 visits and/or 12/31/2025.     PLAN  - Continue Plan of Care  - Upgrade activities as tolerated    Teto Arvizu PT    4/7/2025    8:32 AM  If an interpreting service was utilized for treatment of this patient, the contents of this document represent the material reviewed with the patient via the .     Future Appointments   Date Time Provider Department Center   4/7/2025 11:00 AM Teto Arvizu PT [facility]   4/10/2025  5:40 PM Teto Arvizu PT [facility]   4/14/2025 11:00 AM Teto Arvizu PT [facility]   4/17/2025  5:40 PM Teto Arvizu, PT [facility]   4/21/2025 11:00 AM Teto Arvizu, PT [facility]   4/24/2025  5:40 PM Teto Arvizu PT [facility]   4/28/2025 10:20 AM Joel Crum PTA [facility]   5/1/2025  5:40 PM Teto Arvizu PT [facility]

## 2025-04-10 ENCOUNTER — HOSPITAL ENCOUNTER (OUTPATIENT)
Facility: HOSPITAL | Age: 60
Setting detail: RECURRING SERIES
Discharge: HOME | End: 2025-04-13
Payer: COMMERCIAL

## 2025-04-10 PROCEDURE — 97112 NEUROMUSCULAR REEDUCATION: CPT

## 2025-04-10 PROCEDURE — 97110 THERAPEUTIC EXERCISES: CPT

## 2025-04-10 NOTE — PROGRESS NOTES
PHYSICAL / OCCUPATIONAL THERAPY - DAILY TREATMENT NOTE    Patient Name: Papa Zayas Jr.    Date: 4/10/2025    : 1965  Insurance: Payor: [employer] / Plan: [employer] / Product Type: *No Product type* /      Patient  verified Yes     Visit #   Current / Total 5 8-12   Time   In / Out 5:43 6:25   Pain   In / Out 6/10 5/10   Subjective Functional Status/Changes: Pt reports continued constant numbness left LE to shin and foot.  Currently no left LBP, but past 2 days pain left proximal posterior thigh.     TREATMENT AREA =  Spinal stenosis, lumbar region with neurogenic claudication  Lumbago with sciatica, left side  Other low back pain    OBJECTIVE    Modalities Rationale:     decrease edema, decrease inflammation, decrease pain, and increase tissue extensibility to improve patient's ability to progress to PLOF and address remaining functional goals.     min [] Estim Unattended, type/location:                                      []  w/ice    []  w/heat    min [] Estim Attended, type/location:                                     []  w/US     []  w/ice    []  w/heat    []  TENS insruct      min []  Mechanical Traction: type/lbs                   []  pro   []  sup   []  int   []  cont    []  before manual    []  after manual    min []  Ultrasound, settings/location:      min  unbill []  Ice     []  Heat    location/position:     min []  Paraffin,  details:     min []  Vasopneumatic Device, press/temp:     min []  Whirlpool / Fluido:    If using vaso (only need to measure limb vaso being performed on)      pre-treatment girth :       post-treatment girth :       measured at (landmark location) :      min []  Other:    Skin assessment post-treatment:   Intact      Therapeutic Procedures:    Tx Min Billable or 1:1 Min (if diff from Tx Min) Procedure, Rationale, Specifics   27  [id number] Therapeutic Exercise (timed):  increase ROM, strength, coordination, balance, and proprioception to improve patient's ability to

## 2025-04-14 ENCOUNTER — HOSPITAL ENCOUNTER (OUTPATIENT)
Facility: HOSPITAL | Age: 60
Setting detail: RECURRING SERIES
Discharge: HOME | End: 2025-04-17
Payer: COMMERCIAL

## 2025-04-14 PROCEDURE — 97110 THERAPEUTIC EXERCISES: CPT

## 2025-04-14 PROCEDURE — 97112 NEUROMUSCULAR REEDUCATION: CPT

## 2025-04-14 NOTE — PROGRESS NOTES
will continue to benefit from skilled PT / OT services to modify and progress therapeutic interventions, analyze and address functional mobility deficits, analyze and address ROM deficits, analyze and address strength deficits, analyze and cue for proper movement patterns, and instruct in home and community integration to address functional deficits and attain remaining goals.     Progress toward goals / Updated goals:  []  See Progress Note/Recertification     Short Term Goals: To be accomplished in 3-4 weeks  Patient Independent and compliant with progressive HEP/Self-Care Routine.               Status at IE:  No HEP.  Pt performed stretches in session today; added right sidelying positional traction to HEP..  Decrease max pain scale rating to </= 1-2/10 and/or by >/= 50%.               Status at IE:  Max pain information not reported by patient on Intake form.              7/10 pre-Tx and 7/10 post-Tx today--left lateral hip/pelvis andproximal posterior thigh.  Increase trunk AROM for rotations in standing to WFL and OK bilat.               Status at IE:  Rotation full right with slight right LB discomfort, decreased ~25% left with left LB discomfort.              Not addressed this visit; cristal decreased motion to left in right sidelying for positional traction.  Increase trunk extension in standing to WFL and prone press-up to decreased by </= 25%.              Status at IE:  Standing Extension decreased by ~25% and mild left posterolateral back/waist discomfort; Prone press-up decreased by >/= 50% with left lower back discomfort.  Standing back bends decreased ~75% and painful.     Long Term Goals: To be accomplished in 4-6 weeks  Improve Oswestry score to </= 20-25%.               Status at IE:  38%.              Reassess at 30 days/10 visits for PN.  2. Increase bilat HS flexibility to </= - 25 to -30 deg.               Status at IE:  about -40 to -45 deg right and about -45 to -50 deg left.

## 2025-04-17 ENCOUNTER — HOSPITAL ENCOUNTER (OUTPATIENT)
Facility: HOSPITAL | Age: 60
Setting detail: RECURRING SERIES
End: 2025-04-17
Payer: COMMERCIAL

## 2025-04-21 ENCOUNTER — TELEPHONE (OUTPATIENT)
Facility: HOSPITAL | Age: 60
End: 2025-04-21

## 2025-04-21 ENCOUNTER — HOSPITAL ENCOUNTER (OUTPATIENT)
Facility: HOSPITAL | Age: 60
Setting detail: RECURRING SERIES
End: 2025-04-21
Payer: COMMERCIAL

## 2025-04-21 NOTE — THERAPY DISCHARGE
[facility]  [address], [address], [place] [address] Ph: [phone] Fx: [phone]   DISCHARGE SUMMARY  Patient Name: Papa Zayas Jr. : 1965   Medical/Treatment Diagnosis: Spinal stenosis, lumbar region with neurogenic claudication  Lumbago with sciatica, left side  Other low back pain   Referral Source: Sirisha Calixto PA     Date of Initial Visit: 2025 Attended Visits: 6 Missed Visits: 2     SUMMARY OF TREATMENT:    Patient seen in PT for Initial Evaluation on 2025 and for 5 additional follow-up treatment sessions, 25 to 25; patient called to cancel on day of appointments scheduled for  and 04/2/15.  PT treatment visits consisted of Therapeutic Exercise, Therapeutic Activity, Neuromuscular Re-Education, HEP/Self-Care Instruction with handouts, and/or MHP application.    CURRENT STATUS (at last attended PT visit on 25):    Patient reported--\"pain 'inside' left lateral hip and down into proximal posterior thigh and down \"inside\" left thigh.\"    Therapist noted--\"Did well with seated and standing HS stretches, seated adductor stretches. Transitional pain with return from right side bending in standing (legs crossed), discomfort when performed to the left. Discomfort and limited motion with back bends/extension in standing. One attempt at standing trunk flexion and sharp pain. Pt comfortable in right sidelying over pillows, flexed hipd/lower trunk, and upper torso rotation to left for positional traction--painful with attempt to let lower legs hang off table edge to increase side bending.\"    Short Term Goals: To be accomplished in 3-4 weeks  Patient Independent and compliant with progressive HEP/Self-Care Routine.               Status at IE:  No HEP.  Pt performed stretches in session today; added right sidelying positional traction to HEP..  Decrease max pain scale rating to </= 1-2/10 and/or by >/=

## 2025-04-21 NOTE — TELEPHONE ENCOUNTER
spoke with pt to confirme appt cancellation. pt requested to be discharge bc pt found that it was not helping. pt has appt with neuro MD.

## 2025-04-24 ENCOUNTER — APPOINTMENT (OUTPATIENT)
Facility: HOSPITAL | Age: 60
End: 2025-04-24
Payer: COMMERCIAL

## 2025-04-28 ENCOUNTER — APPOINTMENT (OUTPATIENT)
Facility: HOSPITAL | Age: 60
End: 2025-04-28
Payer: COMMERCIAL